# Patient Record
Sex: MALE | Race: ASIAN | Employment: OTHER | URBAN - METROPOLITAN AREA
[De-identification: names, ages, dates, MRNs, and addresses within clinical notes are randomized per-mention and may not be internally consistent; named-entity substitution may affect disease eponyms.]

---

## 2017-02-27 ENCOUNTER — TELEPHONE (OUTPATIENT)
Dept: INTERNAL MEDICINE CLINIC | Facility: CLINIC | Age: 42
End: 2017-02-27

## 2017-03-02 ENCOUNTER — OFFICE VISIT (OUTPATIENT)
Dept: INTERNAL MEDICINE CLINIC | Facility: CLINIC | Age: 42
End: 2017-03-02

## 2017-03-02 VITALS
BODY MASS INDEX: 34.96 KG/M2 | HEART RATE: 78 BPM | DIASTOLIC BLOOD PRESSURE: 80 MMHG | WEIGHT: 236 LBS | SYSTOLIC BLOOD PRESSURE: 122 MMHG | RESPIRATION RATE: 16 BRPM | HEIGHT: 69 IN

## 2017-03-02 DIAGNOSIS — R73.03 PREDIABETES: ICD-10-CM

## 2017-03-02 DIAGNOSIS — Z51.81 ENCOUNTER FOR THERAPEUTIC DRUG MONITORING: Primary | ICD-10-CM

## 2017-03-02 DIAGNOSIS — E78.2 MIXED HYPERLIPIDEMIA: ICD-10-CM

## 2017-03-02 DIAGNOSIS — E53.8 B12 DEFICIENCY: ICD-10-CM

## 2017-03-02 DIAGNOSIS — E66.09 OBESITY DUE TO EXCESS CALORIES, UNSPECIFIED OBESITY SEVERITY: ICD-10-CM

## 2017-03-02 PROCEDURE — 99214 OFFICE O/P EST MOD 30 MIN: CPT | Performed by: INTERNAL MEDICINE

## 2017-03-02 RX ORDER — PHENTERMINE HYDROCHLORIDE 37.5 MG/1
37.5 TABLET ORAL
Qty: 30 TABLET | Refills: 0 | Status: SHIPPED | OUTPATIENT
Start: 2017-03-02 | End: 2017-08-04

## 2017-03-02 RX ORDER — ATORVASTATIN CALCIUM 10 MG/1
10 TABLET, FILM COATED ORAL DAILY
Qty: 90 TABLET | Refills: 3 | Status: SHIPPED | OUTPATIENT
Start: 2017-03-02 | End: 2018-02-12

## 2017-03-02 NOTE — PROGRESS NOTES
CC: Patient presents with:  Weight Check: up 3 lb       HPI:   1. Obesity, pt doing well on phentermine, wellbutrin and topamax. Worsening hunger. No chest pain.    2. Prediabetes, doing well on metformin.    3. B12 deficiency, finished B12 shots, no pr feels well otherwise, denied any fevers chills or night sweats   CARDIOVASCULAR: denies chest pain    EXAM:   /80 mmHg  Pulse 78  Resp 16  Ht 69\"  Wt 236 lb  BMI 34.84 kg/m2  GENERAL: well nourished,in no apparent distress, A/O x3  HEENT: NCAT, OP-c

## 2017-03-18 ENCOUNTER — TELEPHONE (OUTPATIENT)
Dept: FAMILY MEDICINE CLINIC | Facility: CLINIC | Age: 42
End: 2017-03-18

## 2017-03-20 ENCOUNTER — TELEPHONE (OUTPATIENT)
Dept: FAMILY MEDICINE CLINIC | Facility: CLINIC | Age: 42
End: 2017-03-20

## 2017-03-27 ENCOUNTER — TELEPHONE (OUTPATIENT)
Dept: INTERNAL MEDICINE CLINIC | Facility: CLINIC | Age: 42
End: 2017-03-27

## 2017-03-28 ENCOUNTER — TELEPHONE (OUTPATIENT)
Dept: INTERNAL MEDICINE CLINIC | Facility: CLINIC | Age: 42
End: 2017-03-28

## 2017-08-04 ENCOUNTER — OFFICE VISIT (OUTPATIENT)
Dept: INTERNAL MEDICINE CLINIC | Facility: CLINIC | Age: 42
End: 2017-08-04

## 2017-08-04 VITALS
RESPIRATION RATE: 16 BRPM | WEIGHT: 234 LBS | HEART RATE: 84 BPM | SYSTOLIC BLOOD PRESSURE: 122 MMHG | DIASTOLIC BLOOD PRESSURE: 78 MMHG | BODY MASS INDEX: 34.66 KG/M2 | HEIGHT: 69 IN

## 2017-08-04 DIAGNOSIS — R73.03 PREDIABETES: ICD-10-CM

## 2017-08-04 DIAGNOSIS — E66.09 CLASS 2 OBESITY DUE TO EXCESS CALORIES WITHOUT SERIOUS COMORBIDITY WITH BODY MASS INDEX (BMI) OF 35.0 TO 35.9 IN ADULT: ICD-10-CM

## 2017-08-04 DIAGNOSIS — Z51.81 ENCOUNTER FOR THERAPEUTIC DRUG MONITORING: Primary | ICD-10-CM

## 2017-08-04 PROCEDURE — 99213 OFFICE O/P EST LOW 20 MIN: CPT | Performed by: INTERNAL MEDICINE

## 2017-08-04 RX ORDER — PHENTERMINE HYDROCHLORIDE 37.5 MG/1
37.5 TABLET ORAL
Qty: 30 TABLET | Refills: 0 | Status: SHIPPED | OUTPATIENT
Start: 2017-08-04 | End: 2017-09-14

## 2017-08-04 RX ORDER — PHENTERMINE HYDROCHLORIDE 37.5 MG/1
37.5 TABLET ORAL
COMMUNITY
End: 2017-08-04

## 2017-08-04 NOTE — PROGRESS NOTES
CC: Patient presents with:  Weight Check: down 2 lb       HPI:   1. Obesity, pt doing well on phentermine, wellbutrin and topamax. 2. Prediabetes, doing well on metformin.        Current Outpatient Prescriptions:  Phentermine HCl 37.5 MG Oral Tab Take defined types were placed in this encounter.        Signed Prescriptions Disp Refills    Phentermine HCl 37.5 MG Oral Tab 30 tablet 0      Sig: Take 1 tablet (37.5 mg total) by mouth every morning before breakfast.          None     ASSESSMENT:   Encounter

## 2017-08-16 ENCOUNTER — TELEPHONE (OUTPATIENT)
Dept: FAMILY MEDICINE CLINIC | Facility: CLINIC | Age: 42
End: 2017-08-16

## 2017-08-16 NOTE — TELEPHONE ENCOUNTER
[facility] patient    Pt is currently on Phentermine 37.5 mg and Metformin  mg   Reports burning sensation in stomach (acidity) after taking medication. Pt reports is goes away after taking a glass milk.  Pt reports taking both medications in the morning on

## 2017-08-16 NOTE — TELEPHONE ENCOUNTER
- Patient stated that he is currently taking Phentermine HCl 37.5 MG Oral Tab and MetFORMIN HCl  MG Oral Tablet 24 Hr and it is causing him to have a burning sensation in his stomach.  He would like to speak with a nurse to discuss the sympto

## 2017-08-17 NOTE — TELEPHONE ENCOUNTER
[8/17/2017 11:43 AM] Child, Gabbie:   Dr. Molly Hickey said that Jeramy Delfino can take Prilosec OTC as needed. 1 tablet daily]    LM on cell voicemail for patient to call back.

## 2017-08-17 NOTE — TELEPHONE ENCOUNTER
[facility] message sent to [place], [place] with Dr. Adrianna Valdivia in regards to this message.

## 2017-08-18 NOTE — TELEPHONE ENCOUNTER
Spoke to patient and informed of Dr Noble Means recommendation, verbalized understanding, requested that medication name to be sent to his [employer] as well. Message sent via [employer]. No further questions at this time.     Future Appointments  Date Time Provider

## 2017-08-19 ENCOUNTER — LAB ENCOUNTER (OUTPATIENT)
Dept: LAB | Facility: HOSPITAL | Age: 42
End: 2017-08-19
Attending: INTERNAL MEDICINE
Payer: COMMERCIAL

## 2017-08-19 DIAGNOSIS — E78.2 MIXED HYPERLIPIDEMIA: Primary | ICD-10-CM

## 2017-08-19 DIAGNOSIS — E53.8 VITAMIN B 12 DEFICIENCY: ICD-10-CM

## 2017-08-19 DIAGNOSIS — E66.09 OBESITY DUE TO EXCESS CALORIES: ICD-10-CM

## 2017-08-19 LAB
25-HYDROXYVITAMIN D (TOTAL): 28 NG/ML (ref 30–100)
ALBUMIN SERPL-MCNC: 4.1 G/DL (ref 3.5–4.8)
ALP LIVER SERPL-CCNC: 79 U/L (ref 45–117)
ALT SERPL-CCNC: 54 U/L (ref 17–63)
AST SERPL-CCNC: 24 U/L (ref 15–41)
BASOPHILS # BLD AUTO: 0.07 X10(3) UL (ref 0–0.1)
BASOPHILS NFR BLD AUTO: 1.1 %
BILIRUB SERPL-MCNC: 0.7 MG/DL (ref 0.1–2)
BILIRUB UR QL STRIP.AUTO: NEGATIVE
BUN BLD-MCNC: 9 MG/DL (ref 8–20)
CALCIUM BLD-MCNC: 9.2 MG/DL (ref 8.3–10.3)
CHLORIDE: 108 MMOL/L (ref 101–111)
CHOLEST SMN-MCNC: 90 MG/DL (ref ?–200)
CLARITY UR REFRACT.AUTO: CLEAR
CO2: 23 MMOL/L (ref 22–32)
COLOR UR AUTO: YELLOW
CREAT BLD-MCNC: 1 MG/DL (ref 0.7–1.3)
EOSINOPHIL # BLD AUTO: 0.19 X10(3) UL (ref 0–0.3)
EOSINOPHIL NFR BLD AUTO: 2.9 %
ERYTHROCYTE [DISTWIDTH] IN BLOOD BY AUTOMATED COUNT: 12.8 % (ref 11.5–16)
GLUCOSE BLD-MCNC: 88 MG/DL (ref 70–99)
GLUCOSE UR STRIP.AUTO-MCNC: NEGATIVE MG/DL
HAV AB SERPL IA-ACNC: 312 PG/ML (ref 193–986)
HCT VFR BLD AUTO: 47.4 % (ref 37–53)
HDLC SERPL-MCNC: 39 MG/DL (ref 45–?)
HDLC SERPL: 2.31 {RATIO} (ref ?–4.97)
HGB BLD-MCNC: 15.8 G/DL (ref 13–17)
IMMATURE GRANULOCYTE COUNT: 0.02 X10(3) UL (ref 0–1)
IMMATURE GRANULOCYTE RATIO %: 0.3 %
KETONES UR STRIP.AUTO-MCNC: NEGATIVE MG/DL
LDLC SERPL CALC-MCNC: 30 MG/DL (ref ?–130)
LDLC SERPL-MCNC: 21 MG/DL (ref 5–40)
LEUKOCYTE ESTERASE UR QL STRIP.AUTO: NEGATIVE
LYMPHOCYTES # BLD AUTO: 3.17 X10(3) UL (ref 0.9–4)
LYMPHOCYTES NFR BLD AUTO: 48 %
M PROTEIN MFR SERPL ELPH: 7.7 G/DL (ref 6.1–8.3)
MCH RBC QN AUTO: 27.4 PG (ref 27–33.2)
MCHC RBC AUTO-ENTMCNC: 33.3 G/DL (ref 31–37)
MCV RBC AUTO: 82.1 FL (ref 80–99)
MONOCYTES # BLD AUTO: 0.37 X10(3) UL (ref 0.1–0.6)
MONOCYTES NFR BLD AUTO: 5.6 %
NEUTROPHIL ABS PRELIM: 2.78 X10 (3) UL (ref 1.3–6.7)
NEUTROPHILS # BLD AUTO: 2.78 X10(3) UL (ref 1.3–6.7)
NEUTROPHILS NFR BLD AUTO: 42.1 %
NITRITE UR QL STRIP.AUTO: NEGATIVE
NONHDLC SERPL-MCNC: 51 MG/DL (ref ?–130)
PH UR STRIP.AUTO: 5.5 [PH] (ref 4.5–8)
PLATELET # BLD AUTO: 251 10(3)UL (ref 150–450)
POTASSIUM SERPL-SCNC: 4.1 MMOL/L (ref 3.6–5.1)
PROT UR STRIP.AUTO-MCNC: NEGATIVE MG/DL
RBC # BLD AUTO: 5.77 X10(6)UL (ref 4.3–5.7)
RBC UR QL AUTO: NEGATIVE
RED CELL DISTRIBUTION WIDTH-SD: 37.7 FL (ref 35.1–46.3)
SODIUM SERPL-SCNC: 140 MMOL/L (ref 136–144)
SP GR UR STRIP.AUTO: >=1.03 (ref 1–1.03)
TRIGLYCERIDES: 104 MG/DL (ref ?–150)
TSI SER-ACNC: 1.26 MIU/ML (ref 0.35–5.5)
UROBILINOGEN UR STRIP.AUTO-MCNC: 0.2 MG/DL
WBC # BLD AUTO: 6.6 X10(3) UL (ref 4–13)

## 2017-08-19 PROCEDURE — 82306 VITAMIN D 25 HYDROXY: CPT

## 2017-08-19 PROCEDURE — 85025 COMPLETE CBC W/AUTO DIFF WBC: CPT

## 2017-08-19 PROCEDURE — 80061 LIPID PANEL: CPT

## 2017-08-19 PROCEDURE — 36415 COLL VENOUS BLD VENIPUNCTURE: CPT

## 2017-08-19 PROCEDURE — 84443 ASSAY THYROID STIM HORMONE: CPT

## 2017-08-19 PROCEDURE — 80053 COMPREHEN METABOLIC PANEL: CPT

## 2017-08-19 PROCEDURE — 81003 URINALYSIS AUTO W/O SCOPE: CPT

## 2017-08-19 PROCEDURE — 82607 VITAMIN B-12: CPT

## 2017-08-21 NOTE — PROGRESS NOTES
Low B12  Low Vit D, begin 50,000 U twice weekly for 6 months, and then 1,000 U daily after that  F/U as scheduled

## 2017-08-23 ENCOUNTER — OFFICE VISIT (OUTPATIENT)
Dept: FAMILY MEDICINE CLINIC | Facility: CLINIC | Age: 42
End: 2017-08-23

## 2017-08-23 VITALS
RESPIRATION RATE: 16 BRPM | HEART RATE: 98 BPM | DIASTOLIC BLOOD PRESSURE: 80 MMHG | OXYGEN SATURATION: 99 % | SYSTOLIC BLOOD PRESSURE: 122 MMHG | BODY MASS INDEX: 31.5 KG/M2 | HEIGHT: 71 IN | WEIGHT: 225 LBS | TEMPERATURE: 99 F

## 2017-08-23 DIAGNOSIS — J20.9 BRONCHITIS WITH BRONCHOSPASM: Primary | ICD-10-CM

## 2017-08-23 PROCEDURE — 99213 OFFICE O/P EST LOW 20 MIN: CPT | Performed by: NURSE PRACTITIONER

## 2017-08-23 RX ORDER — PREDNISONE 20 MG/1
20 TABLET ORAL DAILY
Qty: 5 TABLET | Refills: 0 | Status: SHIPPED | OUTPATIENT
Start: 2017-08-23 | End: 2017-08-28

## 2017-08-23 RX ORDER — ERGOCALCIFEROL 1.25 MG/1
CAPSULE ORAL
Qty: 48 CAPSULE | Refills: 0 | OUTPATIENT
Start: 2017-08-23

## 2017-08-23 RX ORDER — AZITHROMYCIN 250 MG/1
TABLET, FILM COATED ORAL
Qty: 6 TABLET | Refills: 0 | Status: SHIPPED | OUTPATIENT
Start: 2017-08-23 | End: 2017-09-14

## 2017-08-23 NOTE — TELEPHONE ENCOUNTER
See result note. Patient needs to call office back to discuss labs and med will be sent.     Notes Recorded by Keenan Lucio RN on 8/21/2017 at 4:31 PM CDT  Time started: [address] ended: 431  Total time spent on chart: one min    I called patient to i

## 2017-08-25 RX ORDER — ERGOCALCIFEROL 1.25 MG/1
50000 CAPSULE ORAL
Qty: 24 CAPSULE | Refills: 1 | Status: SHIPPED | OUTPATIENT
Start: 2017-08-28 | End: 2017-11-17

## 2017-09-14 ENCOUNTER — OFFICE VISIT (OUTPATIENT)
Dept: INTERNAL MEDICINE CLINIC | Facility: CLINIC | Age: 42
End: 2017-09-14

## 2017-09-14 VITALS
DIASTOLIC BLOOD PRESSURE: 78 MMHG | SYSTOLIC BLOOD PRESSURE: 122 MMHG | BODY MASS INDEX: 33.77 KG/M2 | HEIGHT: 69 IN | WEIGHT: 228 LBS | RESPIRATION RATE: 16 BRPM | HEART RATE: 78 BPM

## 2017-09-14 DIAGNOSIS — E66.09 CLASS 2 OBESITY DUE TO EXCESS CALORIES WITHOUT SERIOUS COMORBIDITY WITH BODY MASS INDEX (BMI) OF 35.0 TO 35.9 IN ADULT: ICD-10-CM

## 2017-09-14 DIAGNOSIS — E55.9 VITAMIN D DEFICIENCY: ICD-10-CM

## 2017-09-14 DIAGNOSIS — Z51.81 ENCOUNTER FOR THERAPEUTIC DRUG MONITORING: Primary | ICD-10-CM

## 2017-09-14 DIAGNOSIS — E53.8 B12 DEFICIENCY: ICD-10-CM

## 2017-09-14 DIAGNOSIS — R73.03 PREDIABETES: ICD-10-CM

## 2017-09-14 PROCEDURE — 99214 OFFICE O/P EST MOD 30 MIN: CPT | Performed by: INTERNAL MEDICINE

## 2017-09-14 PROCEDURE — 96372 THER/PROPH/DIAG INJ SC/IM: CPT | Performed by: INTERNAL MEDICINE

## 2017-09-14 RX ORDER — CYANOCOBALAMIN 1000 UG/ML
1000 INJECTION INTRAMUSCULAR; SUBCUTANEOUS ONCE
Status: COMPLETED | OUTPATIENT
Start: 2017-09-14 | End: 2017-09-14

## 2017-09-14 RX ORDER — PHENTERMINE HYDROCHLORIDE 37.5 MG/1
37.5 TABLET ORAL
Qty: 30 TABLET | Refills: 0 | Status: SHIPPED | OUTPATIENT
Start: 2017-09-14 | End: 2018-02-12

## 2017-09-14 RX ADMIN — CYANOCOBALAMIN 1000 MCG: 1000 INJECTION INTRAMUSCULAR; SUBCUTANEOUS at 14:52:00

## 2017-09-14 NOTE — PATIENT INSTRUCTIONS
allegra 180mg qam  Zyrtec 10mg qpm  flonase 2 puffs in each nostril daily  prilsoe 20mg daily for 1 week.

## 2017-09-14 NOTE — PROGRESS NOTES
CC: Patient presents with:  Weight Check: down 6 lb       HPI:   1. Obesity, pt doing well on phentermine, wellbutrin and topamax. No chest pain. 2. Prediabetes, doing well on metformin. 3. Vit D def, doing well on vit D  4.  Vit B12 def, some fatig 69\"   Wt 228 lb   BMI 33.67 kg/m²   GENERAL:  A/O x3  HEENT: NCAT, OP+PND, PERRLA  LUNGS: CTA bilat   CARDIO: RRR without murmur    No orders of the defined types were placed in this encounter.        Signed Prescriptions Disp Refills    Phentermine HCl 37

## 2017-09-20 ENCOUNTER — TELEPHONE (OUTPATIENT)
Dept: FAMILY MEDICINE CLINIC | Facility: CLINIC | Age: 42
End: 2017-09-20

## 2017-09-20 NOTE — TELEPHONE ENCOUNTER
Returned patient's call x2. Phone rings and then changes to a busy signal, unable to leave a message.

## 2017-09-20 NOTE — TELEPHONE ENCOUNTER
Pt called states he was sen 2 weeks ago states meds are not working pt would like another rx prescribed

## 2017-09-21 NOTE — TELEPHONE ENCOUNTER
Time started: 1009    Time ended: 1009    Total time spent on chart: 30 seconds. FYI says to call home number - called that and left message to call back.

## 2017-09-22 NOTE — TELEPHONE ENCOUNTER
Attempted to contact pt, 3rd Outreach, no answer on any of the numbers below, VM left on all the phone numbers, left message to call the office. Telephone Information:   Home Phone [phone]   Work Phone Not on file.    Mobile [address]

## 2017-09-25 NOTE — TELEPHONE ENCOUNTER
Patient was last seen for cough and told to use: Allegra in am  Zyrtec in night  Nasal spray    Worse at night. Advised he would need to be evaluated - patient was hesitant.   I explained that Dr. Oli Jara is no longer with [facility] and another provider will

## 2018-02-12 PROBLEM — F52.4 PREMATURE EJACULATION: Status: ACTIVE | Noted: 2018-02-12

## 2018-02-12 PROCEDURE — 82607 VITAMIN B-12: CPT | Performed by: INTERNAL MEDICINE

## 2018-03-21 PROBLEM — M54.9 BACK PAIN: Status: ACTIVE | Noted: 2018-03-21

## 2018-04-25 ENCOUNTER — OFFICE VISIT (OUTPATIENT)
Dept: FAMILY MEDICINE CLINIC | Facility: CLINIC | Age: 43
End: 2018-04-25

## 2018-04-25 ENCOUNTER — HOSPITAL ENCOUNTER (EMERGENCY)
Facility: HOSPITAL | Age: 43
Discharge: HOME | End: 2018-04-25
Attending: EMERGENCY MEDICINE
Payer: MEDICAID

## 2018-04-25 VITALS
HEART RATE: 78 BPM | BODY MASS INDEX: 32.9 KG/M2 | RESPIRATION RATE: 18 BRPM | TEMPERATURE: 98 F | WEIGHT: 235 LBS | HEIGHT: 71 IN | SYSTOLIC BLOOD PRESSURE: 142 MMHG | DIASTOLIC BLOOD PRESSURE: 76 MMHG | OXYGEN SATURATION: 99 %

## 2018-04-25 VITALS
HEART RATE: 82 BPM | HEIGHT: 71 IN | BODY MASS INDEX: 32.76 KG/M2 | RESPIRATION RATE: 18 BRPM | DIASTOLIC BLOOD PRESSURE: 82 MMHG | SYSTOLIC BLOOD PRESSURE: 130 MMHG | WEIGHT: 234 LBS | OXYGEN SATURATION: 98 % | TEMPERATURE: 98 F

## 2018-04-25 DIAGNOSIS — Z02.9 ENCOUNTER FOR ADMINISTRATIVE EXAMINATIONS, UNSPECIFIED: Primary | ICD-10-CM

## 2018-04-25 DIAGNOSIS — R19.7 DIARRHEA, UNSPECIFIED TYPE: ICD-10-CM

## 2018-04-25 DIAGNOSIS — J02.9 SORE THROAT: ICD-10-CM

## 2018-04-25 DIAGNOSIS — R50.9 FEVER, UNSPECIFIED FEVER CAUSE: Primary | ICD-10-CM

## 2018-04-25 DIAGNOSIS — T75.3XXA MOTION SICKNESS, INITIAL ENCOUNTER: ICD-10-CM

## 2018-04-25 PROCEDURE — 81003 URINALYSIS AUTO W/O SCOPE: CPT | Performed by: EMERGENCY MEDICINE

## 2018-04-25 PROCEDURE — 86403 PARTICLE AGGLUT ANTBDY SCRN: CPT | Performed by: EMERGENCY MEDICINE

## 2018-04-25 PROCEDURE — 87272 CRYPTOSPORIDIUM AG IF: CPT | Performed by: EMERGENCY MEDICINE

## 2018-04-25 PROCEDURE — 87045 FECES CULTURE AEROBIC BACT: CPT | Performed by: EMERGENCY MEDICINE

## 2018-04-25 PROCEDURE — 87046 STOOL CULTR AEROBIC BACT EA: CPT | Performed by: EMERGENCY MEDICINE

## 2018-04-25 PROCEDURE — 87177 OVA AND PARASITES SMEARS: CPT | Performed by: EMERGENCY MEDICINE

## 2018-04-25 PROCEDURE — 87209 SMEAR COMPLEX STAIN: CPT | Performed by: EMERGENCY MEDICINE

## 2018-04-25 PROCEDURE — 87329 GIARDIA AG IA: CPT | Performed by: EMERGENCY MEDICINE

## 2018-04-25 PROCEDURE — 87427 SHIGA-LIKE TOXIN AG IA: CPT | Performed by: EMERGENCY MEDICINE

## 2018-04-25 PROCEDURE — 96360 HYDRATION IV INFUSION INIT: CPT

## 2018-04-25 PROCEDURE — 87207 SMEAR SPECIAL STAIN: CPT | Performed by: EMERGENCY MEDICINE

## 2018-04-25 PROCEDURE — 85025 COMPLETE CBC W/AUTO DIFF WBC: CPT | Performed by: EMERGENCY MEDICINE

## 2018-04-25 PROCEDURE — 99284 EMERGENCY DEPT VISIT MOD MDM: CPT

## 2018-04-25 PROCEDURE — 96361 HYDRATE IV INFUSION ADD-ON: CPT

## 2018-04-25 PROCEDURE — 82272 OCCULT BLD FECES 1-3 TESTS: CPT | Performed by: EMERGENCY MEDICINE

## 2018-04-25 PROCEDURE — 87040 BLOOD CULTURE FOR BACTERIA: CPT | Performed by: EMERGENCY MEDICINE

## 2018-04-25 PROCEDURE — 80053 COMPREHEN METABOLIC PANEL: CPT | Performed by: EMERGENCY MEDICINE

## 2018-04-25 PROCEDURE — 87880 STREP A ASSAY W/OPTIC: CPT | Performed by: NURSE PRACTITIONER

## 2018-04-25 PROCEDURE — 36415 COLL VENOUS BLD VENIPUNCTURE: CPT

## 2018-04-25 PROCEDURE — 87493 C DIFF AMPLIFIED PROBE: CPT | Performed by: EMERGENCY MEDICINE

## 2018-04-25 RX ORDER — CIPROFLOXACIN 500 MG/1
500 TABLET, FILM COATED ORAL 2 TIMES DAILY
Qty: 14 TABLET | Refills: 0 | Status: SHIPPED | OUTPATIENT
Start: 2018-04-25 | End: 2018-05-02

## 2018-04-25 NOTE — ED INITIAL ASSESSMENT (HPI)
Patient states he is recently back from [place], diarrhea/cramps/abd pain and sore throat  x 3 weeks. Strep negative. Patient states he has been feverish since last night.  Patient with episodes abdominal tenderness and cramping worse with diarrhea

## 2018-04-25 NOTE — PROGRESS NOTES
Patient presents with:  Cough: diarrhea, cramps and abdominal pain, sore throat  x 3 weeks fatigue, bodyaches x 1 day   Per patient he just returned from [place]. He had the symptoms for approximately 3 weeks.   He took some OTC medication for his diarrhe

## 2018-04-26 NOTE — ED PROVIDER NOTES
Patient Seen in: [facility] Emergency Department    History   Patient presents with:  Abdomen/Flank Pain (GI/)    Stated Complaint: abd pain    HPI    Patient is a 48-year-old male who was in [place] for 3 weeks. Return yesterday.   He has had lo acute distress  HEENT: Normal cephalic atraumatic. Nonicteric sclera. Moist mucous membranes. No meningismus. No adenopathy  Lungs: No tachypnea. Lungs clear to auscultation bilaterally without rales/rhonchi.   Equal breath sounds bilaterally  Cardiac: Abnormality         Status                     ---------                               -----------         ------                     OVA AND PARASITES(P)[[id number]]                             In process                 GIARDIA + CRYPT

## 2018-08-20 PROBLEM — R10.31 INGUINAL PAIN, RIGHT: Status: ACTIVE | Noted: 2018-08-20

## 2018-10-01 ENCOUNTER — HOSPITAL ENCOUNTER (EMERGENCY)
Facility: HOSPITAL | Age: 43
Discharge: HOME | End: 2018-10-01
Attending: EMERGENCY MEDICINE
Payer: MEDICAID

## 2018-10-01 VITALS
DIASTOLIC BLOOD PRESSURE: 70 MMHG | WEIGHT: 230 LBS | SYSTOLIC BLOOD PRESSURE: 124 MMHG | OXYGEN SATURATION: 98 % | HEART RATE: 64 BPM | BODY MASS INDEX: 32.2 KG/M2 | TEMPERATURE: 98 F | RESPIRATION RATE: 16 BRPM | HEIGHT: 71 IN

## 2018-10-01 DIAGNOSIS — H81.399 PERIPHERAL VERTIGO, UNSPECIFIED LATERALITY: Primary | ICD-10-CM

## 2018-10-01 PROCEDURE — 96374 THER/PROPH/DIAG INJ IV PUSH: CPT

## 2018-10-01 PROCEDURE — 80053 COMPREHEN METABOLIC PANEL: CPT | Performed by: EMERGENCY MEDICINE

## 2018-10-01 PROCEDURE — 80053 COMPREHEN METABOLIC PANEL: CPT

## 2018-10-01 PROCEDURE — 85025 COMPLETE CBC W/AUTO DIFF WBC: CPT

## 2018-10-01 PROCEDURE — 99284 EMERGENCY DEPT VISIT MOD MDM: CPT

## 2018-10-01 PROCEDURE — 83690 ASSAY OF LIPASE: CPT | Performed by: EMERGENCY MEDICINE

## 2018-10-01 PROCEDURE — 85025 COMPLETE CBC W/AUTO DIFF WBC: CPT | Performed by: EMERGENCY MEDICINE

## 2018-10-01 PROCEDURE — 83690 ASSAY OF LIPASE: CPT

## 2018-10-01 RX ORDER — ONDANSETRON 2 MG/ML
4 INJECTION INTRAMUSCULAR; INTRAVENOUS ONCE
Status: COMPLETED | OUTPATIENT
Start: 2018-10-01 | End: 2018-10-01

## 2018-10-01 RX ORDER — MECLIZINE HYDROCHLORIDE 25 MG/1
25 TABLET ORAL 3 TIMES DAILY PRN
Qty: 3 TABLET | Refills: 0 | Status: SHIPPED | OUTPATIENT
Start: 2018-10-01 | End: 2019-01-03 | Stop reason: ALTCHOICE

## 2018-10-01 RX ORDER — ONDANSETRON 4 MG/1
4 TABLET, ORALLY DISINTEGRATING ORAL EVERY 4 HOURS PRN
Qty: 10 TABLET | Refills: 0 | Status: SHIPPED | OUTPATIENT
Start: 2018-10-01 | End: 2018-10-08

## 2018-10-01 RX ORDER — MECLIZINE HYDROCHLORIDE 25 MG/1
50 TABLET ORAL ONCE
Status: COMPLETED | OUTPATIENT
Start: 2018-10-01 | End: 2018-10-01

## 2018-10-01 NOTE — ED NOTES
This RN is completing pt triage assessment, pt started saying, \"these are all stupid questions. \" Informed pt that these questions are part of the process which includes safety questions.  Pt continues to say, \"such stupid questions, I already told you th

## 2018-10-01 NOTE — ED NOTES
This RN to bedside after noted that pt shouting at bedside RN, calling her stupid. Pt reports that RN was being rude and asking stupid questions.   This RN apologized if pt felt that RN was being rude and asked what specific question the patient found \"st

## 2018-10-01 NOTE — ED INITIAL ASSESSMENT (HPI)
Pt c/o diffused abd pain with n/v x3 since 2200 last noc. Also reports dizziness rocio with movement.  Pt denies visual changes, denies urinary sxs, 1x diarrhea, denies KAHLIL, denies CP

## 2018-10-01 NOTE — ED PROVIDER NOTES
Patient Seen in: [facility] Emergency Department    History   Patient presents with:  Nausea/Vomiting/Diarrhea (gastrointestinal)  Dizziness (neurologic)    Stated Complaint: nvd    HPI    Patient's 41-year-old gentleman complains of dizziness descr 98 %   O2 Device None (Room air)       Current:/67   Pulse 60   Temp 97.5 °F (36.4 °C) (Temporal)   Resp 16   Ht 180.3 cm (5' 11\")   Wt 104.3 kg   SpO2 98%   BMI 32.08 kg/m²          Physical Exam   Constitutional: He is oriented to person, place, a MDM   Patient was brought back to the examination room immediately. The patient was then placed on a cardiac monitor and pulse oximetry was applied. Patient had an IV established and labs were drawn.     Patient laboratory workup is reassuri

## 2018-10-23 ENCOUNTER — HOSPITAL ENCOUNTER (OUTPATIENT)
Age: 43
Discharge: HOME | End: 2018-10-23
Attending: FAMILY MEDICINE
Payer: MEDICAID

## 2018-10-23 VITALS
BODY MASS INDEX: 32.58 KG/M2 | OXYGEN SATURATION: 97 % | DIASTOLIC BLOOD PRESSURE: 79 MMHG | SYSTOLIC BLOOD PRESSURE: 128 MMHG | WEIGHT: 220 LBS | TEMPERATURE: 98 F | HEIGHT: 69 IN | HEART RATE: 80 BPM | RESPIRATION RATE: 16 BRPM

## 2018-10-23 DIAGNOSIS — M25.511 ACUTE PAIN OF RIGHT SHOULDER: ICD-10-CM

## 2018-10-23 DIAGNOSIS — J02.9 ACUTE VIRAL PHARYNGITIS: Primary | ICD-10-CM

## 2018-10-23 PROCEDURE — 87081 CULTURE SCREEN ONLY: CPT | Performed by: FAMILY MEDICINE

## 2018-10-23 PROCEDURE — 99204 OFFICE O/P NEW MOD 45 MIN: CPT

## 2018-10-23 PROCEDURE — 99214 OFFICE O/P EST MOD 30 MIN: CPT

## 2018-10-23 PROCEDURE — 87430 STREP A AG IA: CPT | Performed by: FAMILY MEDICINE

## 2018-10-23 RX ORDER — BENZONATATE 100 MG/1
100 CAPSULE ORAL 3 TIMES DAILY PRN
Qty: 30 CAPSULE | Refills: 0 | Status: SHIPPED | OUTPATIENT
Start: 2018-10-23 | End: 2018-11-22

## 2018-10-23 RX ORDER — PREDNISONE 20 MG/1
20 TABLET ORAL DAILY
Qty: 3 TABLET | Refills: 0 | Status: SHIPPED | OUTPATIENT
Start: 2018-10-23 | End: 2018-10-26

## 2018-10-23 RX ORDER — IBUPROFEN 600 MG/1
600 TABLET ORAL EVERY 8 HOURS PRN
Qty: 30 TABLET | Refills: 0 | Status: SHIPPED | OUTPATIENT
Start: 2018-10-23 | End: 2018-10-30

## 2018-10-23 NOTE — ED PROVIDER NOTES
Patient Seen in: [facility]    History   Patient presents with:  Cough/URI    Stated Complaint: cough, sore throat x3 days    HPI    44-year-old male with history of diabetes, hypertension, allergic rhinitis, sleep apnea pr in HPI. Constitutional and vital signs reviewed. All other systems reviewed and negative except as noted above.     Physical Exam     ED Triage Vitals [10/23/18 1150]   /79   Pulse 80   Resp 16   Temp 97.8 °F (36.6 °C)   Temp src Temporal   SpO2 Discharge Medication List    START taking these medications    benzonatate 100 MG Oral Cap  Take 1 capsule (100 mg total) by mouth 3 (three) times daily as needed for cough.   Qty: 30 capsule Refills: 0    predniSONE 20 MG Oral Tab  Take 1 tablet (20 mg tot

## 2018-10-26 ENCOUNTER — HOSPITAL ENCOUNTER (OUTPATIENT)
Age: 43
Discharge: HOME | End: 2018-10-26
Attending: FAMILY MEDICINE
Payer: MEDICAID

## 2018-10-26 VITALS
OXYGEN SATURATION: 96 % | RESPIRATION RATE: 16 BRPM | BODY MASS INDEX: 32.2 KG/M2 | WEIGHT: 230 LBS | SYSTOLIC BLOOD PRESSURE: 123 MMHG | TEMPERATURE: 98 F | HEART RATE: 85 BPM | DIASTOLIC BLOOD PRESSURE: 76 MMHG | HEIGHT: 71 IN

## 2018-10-26 DIAGNOSIS — J00 ACUTE NASOPHARYNGITIS: Primary | ICD-10-CM

## 2018-10-26 PROCEDURE — 99214 OFFICE O/P EST MOD 30 MIN: CPT

## 2018-10-26 PROCEDURE — 99213 OFFICE O/P EST LOW 20 MIN: CPT

## 2018-10-26 RX ORDER — CODEINE PHOSPHATE AND GUAIFENESIN 10; 100 MG/5ML; MG/5ML
5 SOLUTION ORAL EVERY 6 HOURS PRN
Qty: 100 ML | Refills: 0 | Status: SHIPPED | OUTPATIENT
Start: 2018-10-26 | End: 2019-01-03 | Stop reason: ALTCHOICE

## 2018-10-26 NOTE — ED PROVIDER NOTES
Patient Seen in: [facility]    History   Patient presents with:  Cough/URI  Sore Throat    Stated Complaint: sore throat;cough x7 days    HPI    68-year-old male with history allergic rhinitis, diabetes, and hypertension wa to palpation. Bilateral TMs are clear. Bilateral nares are clear. MMM. Posterior pharynx with mild erythema. No exudate. Uvula is midline. Neck: Supple, NT, FROM. No meningeal signs. LAD: No lymphadenopathy.   Heart: S1,S2 RRR, No murmur  Lungs: CTA bila

## 2018-11-30 ENCOUNTER — HOSPITAL ENCOUNTER (OUTPATIENT)
Age: 43
Discharge: HOME | End: 2018-11-30
Attending: EMERGENCY MEDICINE
Payer: MEDICAID

## 2018-11-30 ENCOUNTER — LAB ENCOUNTER (OUTPATIENT)
Dept: LAB | Facility: HOSPITAL | Age: 43
End: 2018-11-30
Attending: INTERNAL MEDICINE
Payer: MEDICAID

## 2018-11-30 VITALS
DIASTOLIC BLOOD PRESSURE: 82 MMHG | BODY MASS INDEX: 32.2 KG/M2 | SYSTOLIC BLOOD PRESSURE: 127 MMHG | HEART RATE: 80 BPM | TEMPERATURE: 97 F | RESPIRATION RATE: 18 BRPM | OXYGEN SATURATION: 98 % | WEIGHT: 230 LBS | HEIGHT: 71 IN

## 2018-11-30 DIAGNOSIS — R73.03 PREDIABETES: ICD-10-CM

## 2018-11-30 DIAGNOSIS — F52.4 PREMATURE EJACULATION: ICD-10-CM

## 2018-11-30 DIAGNOSIS — Z51.81 ENCOUNTER FOR THERAPEUTIC DRUG MONITORING: ICD-10-CM

## 2018-11-30 DIAGNOSIS — Z00.00 WELLNESS EXAMINATION: ICD-10-CM

## 2018-11-30 DIAGNOSIS — Z23 NEED FOR PROPHYLACTIC VACCINATION WITH COMBINED DIPHTHERIA-TETANUS-PERTUSSIS (DTP) VACCINE: ICD-10-CM

## 2018-11-30 DIAGNOSIS — R11.0 NAUSEA: Primary | ICD-10-CM

## 2018-11-30 DIAGNOSIS — R10.31 INGUINAL PAIN, RIGHT: ICD-10-CM

## 2018-11-30 DIAGNOSIS — E78.2 MIXED HYPERLIPIDEMIA: ICD-10-CM

## 2018-11-30 DIAGNOSIS — Z01.89 ROUTINE LAB DRAW: ICD-10-CM

## 2018-11-30 DIAGNOSIS — E66.09 CLASS 2 OBESITY DUE TO EXCESS CALORIES WITHOUT SERIOUS COMORBIDITY WITH BODY MASS INDEX (BMI) OF 35.0 TO 35.9 IN ADULT: ICD-10-CM

## 2018-11-30 PROCEDURE — 80053 COMPREHEN METABOLIC PANEL: CPT

## 2018-11-30 PROCEDURE — 82607 VITAMIN B-12: CPT

## 2018-11-30 PROCEDURE — 99214 OFFICE O/P EST MOD 30 MIN: CPT

## 2018-11-30 PROCEDURE — 36415 COLL VENOUS BLD VENIPUNCTURE: CPT

## 2018-11-30 PROCEDURE — 99213 OFFICE O/P EST LOW 20 MIN: CPT

## 2018-11-30 PROCEDURE — 84443 ASSAY THYROID STIM HORMONE: CPT

## 2018-11-30 PROCEDURE — 84153 ASSAY OF PSA TOTAL: CPT

## 2018-11-30 PROCEDURE — 82306 VITAMIN D 25 HYDROXY: CPT

## 2018-11-30 PROCEDURE — 80061 LIPID PANEL: CPT

## 2018-11-30 PROCEDURE — 85025 COMPLETE CBC W/AUTO DIFF WBC: CPT

## 2018-11-30 PROCEDURE — 81003 URINALYSIS AUTO W/O SCOPE: CPT

## 2018-11-30 RX ORDER — FAMOTIDINE 40 MG/1
40 TABLET, FILM COATED ORAL 2 TIMES DAILY PRN
Qty: 30 TABLET | Refills: 0 | Status: SHIPPED | OUTPATIENT
Start: 2018-11-30 | End: 2018-12-30

## 2018-11-30 RX ORDER — ONDANSETRON 4 MG/1
4 TABLET, ORALLY DISINTEGRATING ORAL EVERY 4 HOURS PRN
Qty: 10 TABLET | Refills: 0 | Status: SHIPPED | OUTPATIENT
Start: 2018-11-30 | End: 2018-12-07

## 2018-11-30 NOTE — ED INITIAL ASSESSMENT (HPI)
Pt c/o nausea for 2 days. Pt states that he also has a poor appetite. Pt denies any abd pain and denies any vomiting or diarrhea. Pt also had blood work drawn this morning as an outpt from him PCP.

## 2018-11-30 NOTE — ED PROVIDER NOTES
Patient Seen in: [facility]    History   Patient presents with:  Nausea    Stated Complaint: nausea x1 day    HPI    56-year-old male presents complaining of nausea since yesterday morning which is transiently improved with Anicteric sclera. Oral mucosa is moist.  Oropharynx is normal.  Neck: No adenopathy or thyromegaly. Lungs are clear to auscultation. Heart exam: Normal S1-S2 without extra sounds or murmurs. Regular rate and rhythm. Abdomen: Normoactive bowel sounds.

## 2018-12-09 NOTE — PROGRESS NOTES
Low vit D, start vit D 50,000 U twice weekly for 6 months. B12 is below 400, will start B12 shots next visit.

## 2018-12-10 NOTE — PROGRESS NOTES
Results released to [facility] with providers notes previously  vitamin d ordered  Dr will discuss at next [address]. Pt has viewed [facility] results      Nothing further needed from MD or nurse. Closing encounter.

## 2018-12-19 ENCOUNTER — HOSPITAL ENCOUNTER (OUTPATIENT)
Age: 43
Discharge: HOME | End: 2018-12-19
Attending: FAMILY MEDICINE
Payer: MEDICAID

## 2018-12-19 VITALS
DIASTOLIC BLOOD PRESSURE: 74 MMHG | OXYGEN SATURATION: 97 % | SYSTOLIC BLOOD PRESSURE: 121 MMHG | HEART RATE: 64 BPM | RESPIRATION RATE: 16 BRPM | TEMPERATURE: 98 F

## 2018-12-19 DIAGNOSIS — H61.22 IMPACTED CERUMEN OF LEFT EAR: Primary | ICD-10-CM

## 2018-12-19 PROCEDURE — 99213 OFFICE O/P EST LOW 20 MIN: CPT

## 2018-12-19 PROCEDURE — 69209 REMOVE IMPACTED EAR WAX UNI: CPT

## 2018-12-19 NOTE — ED PROVIDER NOTES
Patient Seen in: [facility]    History   Patient presents with:  Ear Problem Pain (neurosensory)    Stated Complaint: ear pain x1 day    HPI    54-year-old gentleman well-known to me with a history of diabetes and hypertens Right TM is clear. Left ear canal is blocked with cerumen. Unable to assess TM. Nose: Bilateral nares are clear. Mouth: MMM. Posterior pharynx is clear. No erythema. No exudate. Uvula is midline. Neck: Supple, NT, FROM. No meningeal signs.   LAD: No

## 2018-12-19 NOTE — ED INITIAL ASSESSMENT (HPI)
Pt c/o ear discomfort and states it feels like his ears are clogged. Pt states that it has been going on for the last couple of days.

## 2018-12-25 NOTE — ED INITIAL ASSESSMENT (HPI)
Pt c/o cough with sore throat for 3 days. Pt also states he had a fever. Pt was taking deslym and ibuprofen. Pt also states that he has right shoulder pain for the last couple of months. Pt is moving his right arm without difficulty. [facility]

## 2019-06-11 ENCOUNTER — HOSPITAL ENCOUNTER (OUTPATIENT)
Age: 44
Discharge: HOME | End: 2019-06-11
Attending: FAMILY MEDICINE
Payer: MEDICAID

## 2019-06-11 VITALS
HEIGHT: 71 IN | DIASTOLIC BLOOD PRESSURE: 76 MMHG | RESPIRATION RATE: 18 BRPM | OXYGEN SATURATION: 97 % | WEIGHT: 225 LBS | BODY MASS INDEX: 31.5 KG/M2 | SYSTOLIC BLOOD PRESSURE: 119 MMHG | TEMPERATURE: 97 F | HEART RATE: 90 BPM

## 2019-06-11 DIAGNOSIS — J02.9 ACUTE VIRAL PHARYNGITIS: Primary | ICD-10-CM

## 2019-06-11 PROCEDURE — 99214 OFFICE O/P EST MOD 30 MIN: CPT

## 2019-06-11 PROCEDURE — 87430 STREP A AG IA: CPT | Performed by: FAMILY MEDICINE

## 2019-06-11 PROCEDURE — 87081 CULTURE SCREEN ONLY: CPT | Performed by: FAMILY MEDICINE

## 2019-06-11 RX ORDER — PREDNISONE 20 MG/1
20 TABLET ORAL DAILY
Qty: 5 TABLET | Refills: 0 | Status: SHIPPED | OUTPATIENT
Start: 2019-06-11 | End: 2019-06-16

## 2019-06-11 NOTE — ED PROVIDER NOTES
Patient Seen in: [facility]    History   Patient presents with:  Sore Throat    Stated Complaint: sore throat, fever x5 days    HPI    70-year-old male with a history of allergic's, diabetes, hypertension, and sleep apnea p Wt 102.1 kg   SpO2 97%   BMI 31.38 kg/m²         Physical Exam    Gen: Pleasant male in NAD  Head: Normocephalic atraumatic. No sinus tenderness on palpation. Ears: Normal external ear exam. Bilateral TMs are clear. Nose: Bilateral nares are clear.    Giselle Baltazar

## 2019-06-14 NOTE — ED NOTES
Attempted to contact the patient but was unsuccessful. Left a detailed message that his strep was negative, as the voicemail states the patient's name.

## 2019-06-15 ENCOUNTER — LAB ENCOUNTER (OUTPATIENT)
Dept: LAB | Facility: HOSPITAL | Age: 44
End: 2019-06-15
Attending: INTERNAL MEDICINE
Payer: MEDICAID

## 2019-06-15 DIAGNOSIS — R68.82 DECREASED LIBIDO: ICD-10-CM

## 2019-06-15 PROCEDURE — 84146 ASSAY OF PROLACTIN: CPT

## 2019-06-15 PROCEDURE — 82728 ASSAY OF FERRITIN: CPT

## 2019-06-15 PROCEDURE — 84402 ASSAY OF FREE TESTOSTERONE: CPT

## 2019-06-15 PROCEDURE — 36415 COLL VENOUS BLD VENIPUNCTURE: CPT

## 2019-06-15 PROCEDURE — 83540 ASSAY OF IRON: CPT

## 2019-06-15 PROCEDURE — 84443 ASSAY THYROID STIM HORMONE: CPT

## 2019-06-15 PROCEDURE — 84403 ASSAY OF TOTAL TESTOSTERONE: CPT

## 2019-06-15 PROCEDURE — 83550 IRON BINDING TEST: CPT

## 2019-07-16 ENCOUNTER — HOSPITAL ENCOUNTER (OUTPATIENT)
Age: 44
Discharge: HOME | End: 2019-07-16
Attending: EMERGENCY MEDICINE
Payer: MEDICAID

## 2019-07-16 VITALS
HEIGHT: 71 IN | TEMPERATURE: 98 F | DIASTOLIC BLOOD PRESSURE: 77 MMHG | HEART RATE: 76 BPM | RESPIRATION RATE: 18 BRPM | SYSTOLIC BLOOD PRESSURE: 129 MMHG | BODY MASS INDEX: 32.2 KG/M2 | OXYGEN SATURATION: 99 % | WEIGHT: 230 LBS

## 2019-07-16 DIAGNOSIS — K60.2 ANAL FISSURE: Primary | ICD-10-CM

## 2019-07-16 LAB — POCT HEMOCCULT: POSITIVE

## 2019-07-16 PROCEDURE — 99214 OFFICE O/P EST MOD 30 MIN: CPT

## 2019-07-16 PROCEDURE — 82272 OCCULT BLD FECES 1-3 TESTS: CPT | Performed by: EMERGENCY MEDICINE

## 2019-07-16 PROCEDURE — 99213 OFFICE O/P EST LOW 20 MIN: CPT

## 2019-07-16 NOTE — ED INITIAL ASSESSMENT (HPI)
The patient is here with complaints or rectal pain x 2 days with some scant amount of bright red blood both on toilet paper and in the toilet bowel. He believes he has had this a couple of years ago.   Denies any changes in bowel habits, diarrhea, constipa

## 2019-07-18 NOTE — ED PROVIDER NOTES
Patient Seen in: [facility]    History   Patient presents with:  Anal Problem (GI)    Stated Complaint: rectal pain    HPI    25-year-old male complaint of rectal pain the past few days he denies any constipation or history Notable for the following components:       Result Value    Hemoccult Positive (*)     All other components within normal limits                MDM   Patient has a anal fissure he was given a prescription for nitroglycerin and Proctofoam HC advised to foll

## 2019-07-22 ENCOUNTER — OFFICE VISIT (OUTPATIENT)
Dept: SURGERY | Facility: CLINIC | Age: 44
End: 2019-07-22
Payer: MEDICAID

## 2019-07-22 ENCOUNTER — TELEPHONE (OUTPATIENT)
Dept: SURGERY | Facility: CLINIC | Age: 44
End: 2019-07-22

## 2019-07-22 VITALS
SYSTOLIC BLOOD PRESSURE: 104 MMHG | TEMPERATURE: 99 F | HEART RATE: 71 BPM | WEIGHT: 229 LBS | DIASTOLIC BLOOD PRESSURE: 71 MMHG | BODY MASS INDEX: 32.06 KG/M2 | HEIGHT: 71 IN

## 2019-07-22 DIAGNOSIS — K60.0 ACUTE POSTERIOR ANAL FISSURE: Primary | ICD-10-CM

## 2019-07-22 PROCEDURE — 99243 OFF/OP CNSLTJ NEW/EST LOW 30: CPT | Performed by: SURGERY

## 2019-07-22 NOTE — TELEPHONE ENCOUNTER
Pt wife phoned office. States pt was seen in office today. Prescribed nitroglycerin, which will arrive in 3-4 days. Talked to Dr. Yong Encinas may take OTC hydrocortisone. Verbalizes understanding.

## 2019-07-22 NOTE — H&P
New Patient Visit Note       Active Problems      1.  Acute posterior anal fissure        Chief Complaint   Patient presents with:  New Patient: NP anal fissure, ref by Urgent Care, onset 1 wk. ago       History of Present Illness     Pt seen at the request Packs/day: 0.50        Years: 23.00        Pack years: 11.5        Types: Cigarettes      Smokeless tobacco: Never Used      Tobacco comment: in process of quitting    Substance and Sexual Activity      Alcohol use: No        Alcohol/week: 0.0 standard dri bruise/bleed easily. Psychiatric/Behavioral: Negative for behavioral problems and sleep disturbance.        Physical Findings   /71   Pulse 71   Temp 98.6 °F (37 °C)   Ht 71\"   Wt 229 lb   BMI 31.94 kg/m²   Physical Exam   Constitutional: He appear

## 2019-08-27 PROBLEM — M19.041 ARTHRITIS OF FINGER OF RIGHT HAND: Status: ACTIVE | Noted: 2019-08-27

## 2019-09-06 ENCOUNTER — HOSPITAL ENCOUNTER (OUTPATIENT)
Age: 44
Discharge: HOME | End: 2019-09-06
Attending: EMERGENCY MEDICINE
Payer: MEDICAID

## 2019-09-06 VITALS
TEMPERATURE: 98 F | DIASTOLIC BLOOD PRESSURE: 69 MMHG | HEART RATE: 68 BPM | OXYGEN SATURATION: 97 % | SYSTOLIC BLOOD PRESSURE: 128 MMHG | RESPIRATION RATE: 18 BRPM

## 2019-09-06 DIAGNOSIS — H61.23 BILATERAL IMPACTED CERUMEN: Primary | ICD-10-CM

## 2019-09-06 PROCEDURE — 99213 OFFICE O/P EST LOW 20 MIN: CPT

## 2019-09-06 PROCEDURE — 69209 REMOVE IMPACTED EAR WAX UNI: CPT

## 2019-09-06 PROCEDURE — 99212 OFFICE O/P EST SF 10 MIN: CPT

## 2019-09-06 NOTE — ED INITIAL ASSESSMENT (HPI)
Pt c/o hearing loss in both ears since yesterday after using a cotton swab in bilateral ears. Denies other s/s.

## 2019-09-07 NOTE — ED PROVIDER NOTES
Patient Seen in: [address]    History   Patient presents with:  Ear Problem    Stated Complaint: impacted ears    HPI    19-year-old male presents to the emergency department states that \"my ears are clogged\" he is havin impactions his neck is supple and his oropharynx is within normal limits    ED Course   Labs Reviewed - No data to display       Irrigated here in the emergency department large plugs of wax were removed.   Patient be discharged home with Debrox to be used

## 2020-10-27 ENCOUNTER — APPOINTMENT (OUTPATIENT)
Dept: GENERAL RADIOLOGY | Age: 45
End: 2020-10-27
Attending: NURSE PRACTITIONER
Payer: MEDICAID

## 2020-10-27 ENCOUNTER — HOSPITAL ENCOUNTER (OUTPATIENT)
Age: 45
Discharge: HOME | End: 2020-10-27
Payer: MEDICAID

## 2020-10-27 VITALS
OXYGEN SATURATION: 96 % | BODY MASS INDEX: 34.36 KG/M2 | HEIGHT: 70 IN | DIASTOLIC BLOOD PRESSURE: 71 MMHG | TEMPERATURE: 98 F | SYSTOLIC BLOOD PRESSURE: 114 MMHG | HEART RATE: 72 BPM | RESPIRATION RATE: 16 BRPM | WEIGHT: 240 LBS

## 2020-10-27 DIAGNOSIS — S83.92XA SPRAIN OF LEFT KNEE, UNSPECIFIED LIGAMENT, INITIAL ENCOUNTER: Primary | ICD-10-CM

## 2020-10-27 DIAGNOSIS — S89.90XA KNEE INJURY: ICD-10-CM

## 2020-10-27 PROCEDURE — 73562 X-RAY EXAM OF KNEE 3: CPT | Performed by: NURSE PRACTITIONER

## 2020-10-27 PROCEDURE — 99214 OFFICE O/P EST MOD 30 MIN: CPT | Performed by: NURSE PRACTITIONER

## 2020-10-27 PROCEDURE — A6449 LT COMPRES BAND >=3" <5"/YD: HCPCS | Performed by: NURSE PRACTITIONER

## 2020-10-27 RX ORDER — HYDROCODONE BITARTRATE AND ACETAMINOPHEN 5; 325 MG/1; MG/1
1-2 TABLET ORAL EVERY 6 HOURS PRN
Qty: 10 TABLET | Refills: 0 | Status: SHIPPED | OUTPATIENT
Start: 2020-10-27 | End: 2020-11-03

## 2020-10-27 RX ORDER — NAPROXEN 500 MG/1
500 TABLET ORAL 2 TIMES DAILY PRN
Qty: 20 TABLET | Refills: 0 | Status: SHIPPED | OUTPATIENT
Start: 2020-10-27 | End: 2020-11-03

## 2020-10-27 NOTE — ED PROVIDER NOTES
Patient Seen in: [address]      History   Patient presents with:  Knee Pain    Stated Complaint: left knee pain    HPI    13-year-old male presents for evaluation of left knee pain.   He slipped on wet water in the garage 6 days ago distress. Appearance: He is not toxic-appearing. HENT:      Head: Normocephalic and atraumatic.       Nose: Nose normal.      Mouth/Throat:      Mouth: Mucous membranes are moist.   Eyes:      Conjunctiva/sclera: Conjunctivae normal.   Neck:      Musc intact. His main concern is pain control, and has not been improved with ibuprofen or Aleve at home. He is given a prescription for naproxen and Norco, 10 tabs for severe pain or help with sleeping due to discomfort. He is to follow-up with orthopedics.

## 2020-11-15 ENCOUNTER — HOSPITAL ENCOUNTER (OUTPATIENT)
Age: 45
Discharge: HOME | End: 2020-11-15
Payer: MEDICAID

## 2020-11-15 ENCOUNTER — APPOINTMENT (OUTPATIENT)
Dept: CT IMAGING | Age: 45
End: 2020-11-15
Attending: PHYSICIAN ASSISTANT
Payer: MEDICAID

## 2020-11-15 VITALS
TEMPERATURE: 97 F | DIASTOLIC BLOOD PRESSURE: 78 MMHG | SYSTOLIC BLOOD PRESSURE: 135 MMHG | OXYGEN SATURATION: 97 % | RESPIRATION RATE: 18 BRPM | HEART RATE: 95 BPM

## 2020-11-15 DIAGNOSIS — N28.89 RENAL MASS, RIGHT: ICD-10-CM

## 2020-11-15 DIAGNOSIS — S39.012A STRAIN OF LUMBAR REGION, INITIAL ENCOUNTER: ICD-10-CM

## 2020-11-15 DIAGNOSIS — R31.9 HEMATURIA, UNSPECIFIED TYPE: Primary | ICD-10-CM

## 2020-11-15 PROCEDURE — 99213 OFFICE O/P EST LOW 20 MIN: CPT | Performed by: PHYSICIAN ASSISTANT

## 2020-11-15 PROCEDURE — 74176 CT ABD & PELVIS W/O CONTRAST: CPT | Performed by: PHYSICIAN ASSISTANT

## 2020-11-15 PROCEDURE — 81002 URINALYSIS NONAUTO W/O SCOPE: CPT | Performed by: PHYSICIAN ASSISTANT

## 2020-11-15 RX ORDER — NAPROXEN 500 MG/1
500 TABLET ORAL 2 TIMES DAILY PRN
Qty: 20 TABLET | Refills: 0 | Status: SHIPPED | OUTPATIENT
Start: 2020-11-15 | End: 2020-11-22

## 2020-11-15 RX ORDER — CYCLOBENZAPRINE HCL 10 MG
10 TABLET ORAL 3 TIMES DAILY PRN
Qty: 10 TABLET | Refills: 0 | Status: SHIPPED | OUTPATIENT
Start: 2020-11-15 | End: 2020-11-22

## 2020-11-15 NOTE — ED PROVIDER NOTES
Patient Seen in: [facility]      History   Patient presents with:  Back Pain    Stated Complaint: back pain    HPI    CHIEF COMPLAINT: Right low back pain     HISTORY OF PRESENT ILLNESS: Patient is a pleasant 43-year-old male with past medical elements is as listed in today's nurse's notes are reviewed and agree. The patient's family history is reviewed and is noncontributory to the presenting problem, except as indicated as above.     Past Medical History:   Diagnosis Date   • Allergic rhinitis +5 out of 5 bilateral lower extremity strength. 2+ patellar DTRs. Normal sensation. No edema bilaterally. Abdomen: Soft, mild tenderness to palpation in the right lower quadrant, nondistended. No pulsatile masses. No CVA tenderness bilaterally.     E three days. FINDINGS:  Please note the exam is somewhat limited without intravenous or oral contrast.  KIDNEYS:  No evidence of renal stones or hydronephrosis.   A cyst is likely present of the right kidney measuring up to 5.2 x 5.1 cm ADRENALS:  No mass kidneys. I reviewed these results with the patient. He did not remember following with urology at all for the renal mass, so I gave him a referral to urology. For now we will give the patient naproxen and Flexeril for suspected right low back strain.   H

## 2021-03-15 NOTE — PROGRESS NOTES
CHIEF COMPLAINT:   Patient presents with:  Cough: x 2 weeks        HPI:   Joya Siemens is a 36year old male who presents for cough for  2  weeks. Cough started gradually and is described as tight and deep. Patient has history of bronchitis.  This is  simila REVIEW OF SYSTEMS:   GENERAL: No fever or chills. SKIN: No rashes, or other skin lesions. EYES: Denies blurred vision or double vision. HENT: Denies ear pain, decreased hearing, or sore throat. Reports mild sinus congestion.   CARDIOVASCULAR: Rachel Garrison azithromycin 250 MG Oral Tab 6 tablet 0      Sig: Take two tablets by mouth today, then one daily. Side effects, risks, benefits, of medication explained and discussed.     Patient Instructions     Bronchitis, Antibiotic Treatment (Adult)    Daija Kern · Over-the-counter cough, cold, and sore-throat medicines will not shorten the length of the illness, but they may be helpful to reduce symptoms. (Note: Do not use decongestants if you have high blood pressure.)  · Finish all antibiotic medicine.  Do this e none

## 2021-06-03 ENCOUNTER — APPOINTMENT (OUTPATIENT)
Dept: GENERAL RADIOLOGY | Age: 46
End: 2021-06-03
Attending: PHYSICIAN ASSISTANT
Payer: MEDICAID

## 2021-06-03 ENCOUNTER — HOSPITAL ENCOUNTER (OUTPATIENT)
Age: 46
Discharge: HOME | End: 2021-06-03
Payer: MEDICAID

## 2021-06-03 VITALS
DIASTOLIC BLOOD PRESSURE: 72 MMHG | RESPIRATION RATE: 18 BRPM | TEMPERATURE: 97 F | BODY MASS INDEX: 34 KG/M2 | SYSTOLIC BLOOD PRESSURE: 116 MMHG | OXYGEN SATURATION: 97 % | HEART RATE: 73 BPM | WEIGHT: 242.06 LBS

## 2021-06-03 DIAGNOSIS — S20.211A CONTUSION OF RIB ON RIGHT SIDE, INITIAL ENCOUNTER: Primary | ICD-10-CM

## 2021-06-03 PROCEDURE — 99213 OFFICE O/P EST LOW 20 MIN: CPT | Performed by: PHYSICIAN ASSISTANT

## 2021-06-03 PROCEDURE — 71101 X-RAY EXAM UNILAT RIBS/CHEST: CPT | Performed by: PHYSICIAN ASSISTANT

## 2021-06-03 RX ORDER — TRAMADOL HYDROCHLORIDE 50 MG/1
50 TABLET ORAL NIGHTLY PRN
Qty: 7 TABLET | Refills: 0 | Status: SHIPPED | OUTPATIENT
Start: 2021-06-03 | End: 2021-06-10

## 2021-06-03 NOTE — ED INITIAL ASSESSMENT (HPI)
Slipped on carpeted steps approx. 4 days ago. Pain is deep right side mid, thorasic, increased w ROM - minimal relief w antiinflammatory. Denies head injury. Denies hematuria. Denies dyspnea.

## 2021-06-03 NOTE — ED PROVIDER NOTES
Patient Seen in: Immediate [address] [place] [address] Highway      History   Patient presents with:  Fall: left mid back injury    Stated Complaint: RIB PAIN    HPI/Subjective:   HPI    25-year-old male presents to the immediate care with right rib pain since 4 days Conjunctiva/sclera: Conjunctivae normal.   Cardiovascular:      Rate and Rhythm: Normal rate and regular rhythm. Pulmonary:      Effort: Pulmonary effort is normal.      Breath sounds: Normal breath sounds.    Chest:      Chest wall: Tenderness (right low pain control. Symptoms are self-limited. We will also give short course of tramadol for nighttime use to help with sleeping. All questions answered.                              Disposition and Plan     Clinical Impression:  Contusion of rib on right po

## 2021-06-27 PROBLEM — M54.50 CHRONIC BILATERAL LOW BACK PAIN WITHOUT SCIATICA: Status: ACTIVE | Noted: 2021-06-27

## 2021-06-27 PROBLEM — G89.29 CHRONIC BILATERAL LOW BACK PAIN WITHOUT SCIATICA: Status: ACTIVE | Noted: 2021-06-27

## 2021-11-12 ENCOUNTER — APPOINTMENT (OUTPATIENT)
Dept: GENERAL RADIOLOGY | Age: 46
End: 2021-11-12
Attending: PHYSICIAN ASSISTANT
Payer: MEDICAID

## 2021-11-12 ENCOUNTER — HOSPITAL ENCOUNTER (OUTPATIENT)
Age: 46
Discharge: HOME | End: 2021-11-12
Payer: MEDICAID

## 2021-11-12 VITALS
TEMPERATURE: 99 F | BODY MASS INDEX: 32.2 KG/M2 | SYSTOLIC BLOOD PRESSURE: 155 MMHG | WEIGHT: 230 LBS | RESPIRATION RATE: 18 BRPM | DIASTOLIC BLOOD PRESSURE: 84 MMHG | OXYGEN SATURATION: 96 % | HEIGHT: 71 IN | HEART RATE: 88 BPM

## 2021-11-12 DIAGNOSIS — B34.9 VIRAL ILLNESS: Primary | ICD-10-CM

## 2021-11-12 DIAGNOSIS — Z20.822 COVID-19 RULED OUT: ICD-10-CM

## 2021-11-12 PROCEDURE — 99213 OFFICE O/P EST LOW 20 MIN: CPT | Performed by: PHYSICIAN ASSISTANT

## 2021-11-12 PROCEDURE — 71046 X-RAY EXAM CHEST 2 VIEWS: CPT | Performed by: PHYSICIAN ASSISTANT

## 2021-11-12 PROCEDURE — U0002 COVID-19 LAB TEST NON-CDC: HCPCS | Performed by: PHYSICIAN ASSISTANT

## 2021-11-12 PROCEDURE — 87880 STREP A ASSAY W/OPTIC: CPT | Performed by: PHYSICIAN ASSISTANT

## 2021-11-12 NOTE — ED PROVIDER NOTES
Patient Seen in: Immediate [address] [place] [address]      History   Patient presents with:  Covid-19 Test    Stated Complaint: fever, headaches  x 3 days     Subjective:   HPI    58-year-old male presents to the [address] [place] [address] for evaluation of fever for 3 days.   Carly ear normal.      Nose: Congestion present. Mouth/Throat:      Mouth: Mucous membranes are moist.      Pharynx: Posterior oropharyngeal erythema present.    Eyes:      Conjunctiva/sclera: Conjunctivae normal.   Cardiovascular:      Rate and Rhythm: Norm

## 2021-11-12 NOTE — ED INITIAL ASSESSMENT (HPI)
Felt like had a fever but never checked temp. Intermittent sinus pressure both relieved w motrin.   Requesting Covid test.

## 2021-11-28 ENCOUNTER — HOSPITAL ENCOUNTER (OUTPATIENT)
Age: 46
Discharge: HOME | End: 2021-11-28
Payer: MEDICAID

## 2021-11-28 VITALS
SYSTOLIC BLOOD PRESSURE: 161 MMHG | BODY MASS INDEX: 32.2 KG/M2 | RESPIRATION RATE: 20 BRPM | HEART RATE: 76 BPM | WEIGHT: 230 LBS | OXYGEN SATURATION: 96 % | DIASTOLIC BLOOD PRESSURE: 88 MMHG | HEIGHT: 71 IN | TEMPERATURE: 98 F

## 2021-11-28 DIAGNOSIS — H00.024 HORDEOLUM INTERNUM OF LEFT UPPER EYELID: Primary | ICD-10-CM

## 2021-11-28 PROCEDURE — 99213 OFFICE O/P EST LOW 20 MIN: CPT | Performed by: PHYSICIAN ASSISTANT

## 2021-11-28 RX ORDER — OFLOXACIN 3 MG/ML
2 SOLUTION/ DROPS OPHTHALMIC 4 TIMES DAILY
Qty: 5 ML | Refills: 0 | Status: SHIPPED | OUTPATIENT
Start: 2021-11-28 | End: 2022-02-02

## 2021-11-28 NOTE — ED PROVIDER NOTES
Patient Seen in: [facility]      History   Patient presents with:  Eyelid Swelling    Stated Complaint: swollen eyelid    Subjective:   HPI    Pleasant 58-year-old gentleman. Medical history of diabetes, hypertension, sleep apnea.   In the las Conjunctive is normal.  No concurrent cold symptoms  Extremities: Full ROM, no deformity, NVI  Neuro:  Normal Gait    ED Course   Labs Reviewed - No data to display                MDM        Eyelid eversion demonstrates a hordeolum internum to the left upp

## 2021-12-06 PROBLEM — E78.1 HYPERTRIGLYCERIDEMIA: Status: ACTIVE | Noted: 2021-12-06

## 2021-12-06 PROBLEM — R74.8 ELEVATED LIVER ENZYMES: Status: ACTIVE | Noted: 2021-12-06

## 2021-12-20 ENCOUNTER — HOSPITAL ENCOUNTER (OUTPATIENT)
Age: 46
Discharge: LEFT BEFORE DISCHARGE | End: 2021-12-20
Payer: MEDICAID

## 2021-12-22 ENCOUNTER — HOSPITAL ENCOUNTER (OUTPATIENT)
Age: 46
Discharge: HOME | End: 2021-12-22
Payer: MEDICAID

## 2021-12-22 VITALS
HEIGHT: 70 IN | DIASTOLIC BLOOD PRESSURE: 87 MMHG | WEIGHT: 230 LBS | BODY MASS INDEX: 32.93 KG/M2 | TEMPERATURE: 98 F | HEART RATE: 79 BPM | RESPIRATION RATE: 16 BRPM | SYSTOLIC BLOOD PRESSURE: 123 MMHG | OXYGEN SATURATION: 97 %

## 2021-12-22 DIAGNOSIS — H61.23 BILATERAL IMPACTED CERUMEN: Primary | ICD-10-CM

## 2021-12-22 PROCEDURE — 99213 OFFICE O/P EST LOW 20 MIN: CPT | Performed by: NURSE PRACTITIONER

## 2021-12-22 RX ORDER — NEOMYCIN SULFATE, POLYMYXIN B SULFATE AND HYDROCORTISONE 10; 3.5; 1 MG/ML; MG/ML; [USP'U]/ML
SUSPENSION/ DROPS AURICULAR (OTIC)
Qty: 10 ML | Refills: 0 | Status: SHIPPED | OUTPATIENT
Start: 2021-12-22

## 2021-12-23 NOTE — ED PROVIDER NOTES
Patient Seen in: Immediate [address] [place], [place] [address]way      History   Patient presents with:  Ear Problem Pain    Stated Complaint: ear pain and clogged ears  x 2 days     Subjective:   HPI  Patient is 44-year-old male past medical history of type 2 diabetes m Constitutional:       General: He is not in acute distress. Appearance: Normal appearance. He is well-developed. He is not ill-appearing, toxic-appearing or diaphoretic. HENT:      Right Ear: There is impacted cerumen. Left Ear:  There is impac pm    Follow-up:  Jeff Espinoza MD  [address] [phone]      As needed          Medications Prescribed:  Current Discharge Medication List    START taking these medications    neomycin-polymyxin-hydrocortisone [id number] Ot

## 2022-02-02 ENCOUNTER — HOSPITAL ENCOUNTER (EMERGENCY)
Age: 47
Discharge: HOME | End: 2022-02-02
Attending: EMERGENCY MEDICINE
Payer: MEDICAID

## 2022-02-02 VITALS
BODY MASS INDEX: 32.93 KG/M2 | HEIGHT: 70 IN | WEIGHT: 230 LBS | TEMPERATURE: 98 F | HEART RATE: 65 BPM | DIASTOLIC BLOOD PRESSURE: 77 MMHG | SYSTOLIC BLOOD PRESSURE: 136 MMHG | RESPIRATION RATE: 16 BRPM | OXYGEN SATURATION: 100 %

## 2022-02-02 DIAGNOSIS — H60.502 ACUTE OTITIS EXTERNA OF LEFT EAR, UNSPECIFIED TYPE: Primary | ICD-10-CM

## 2022-02-02 PROCEDURE — 99283 EMERGENCY DEPT VISIT LOW MDM: CPT

## 2022-02-02 RX ORDER — CIPROFLOXACIN AND DEXAMETHASONE 3; 1 MG/ML; MG/ML
4 SUSPENSION/ DROPS AURICULAR (OTIC) 2 TIMES DAILY
Qty: 1 EACH | Refills: 0 | Status: SHIPPED | OUTPATIENT
Start: 2022-02-02 | End: 2022-02-09

## 2022-02-02 RX ORDER — CIPROFLOXACIN AND DEXAMETHASONE 3; 1 MG/ML; MG/ML
4 SUSPENSION/ DROPS AURICULAR (OTIC) ONCE
Status: COMPLETED | OUTPATIENT
Start: 2022-02-02 | End: 2022-02-02

## 2022-02-03 ENCOUNTER — APPOINTMENT (OUTPATIENT)
Dept: CT IMAGING | Age: 47
End: 2022-02-03
Attending: NURSE PRACTITIONER
Payer: MEDICAID

## 2022-02-03 ENCOUNTER — HOSPITAL ENCOUNTER (OUTPATIENT)
Age: 47
Discharge: HOME | End: 2022-02-03
Payer: MEDICAID

## 2022-02-03 VITALS
HEIGHT: 70 IN | HEART RATE: 68 BPM | DIASTOLIC BLOOD PRESSURE: 61 MMHG | RESPIRATION RATE: 16 BRPM | WEIGHT: 230 LBS | BODY MASS INDEX: 32.93 KG/M2 | SYSTOLIC BLOOD PRESSURE: 128 MMHG | OXYGEN SATURATION: 100 % | TEMPERATURE: 98 F

## 2022-02-03 DIAGNOSIS — H60.92 OTITIS EXTERNA OF LEFT EAR, UNSPECIFIED CHRONICITY, UNSPECIFIED TYPE: Primary | ICD-10-CM

## 2022-02-03 DIAGNOSIS — H66.92 CHRONIC LEFT MIDDLE EAR INFECTION: ICD-10-CM

## 2022-02-03 LAB
#MXD IC: 0.7 X10ˆ3/UL (ref 0.1–1)
BASOPHILS # BLD AUTO: 0.07 X10(3) UL (ref 0–0.2)
BASOPHILS NFR BLD AUTO: 0.9 %
BUN BLD-MCNC: 9 MG/DL (ref 7–18)
CHLORIDE BLD-SCNC: 105 MMOL/L (ref 98–112)
CO2 BLD-SCNC: 24 MMOL/L (ref 21–32)
CREAT BLD-MCNC: 0.7 MG/DL
EOSINOPHIL # BLD AUTO: 0.23 X10(3) UL (ref 0–0.7)
EOSINOPHIL NFR BLD AUTO: 2.9 %
ERYTHROCYTE [DISTWIDTH] IN BLOOD BY AUTOMATED COUNT: 12.8 %
GLUCOSE BLD-MCNC: 128 MG/DL (ref 70–99)
HCT VFR BLD AUTO: 45.7 %
HCT VFR BLD AUTO: 45.8 %
HCT VFR BLD CALC: 46 %
HGB BLD-MCNC: 15.3 G/DL
HGB BLD-MCNC: 15.4 G/DL
IMM GRANULOCYTES # BLD AUTO: 0.02 X10(3) UL (ref 0–1)
IMM GRANULOCYTES NFR BLD: 0.3 %
ISTAT IONIZED CALCIUM FOR CHEM 8: 1.21 MMOL/L (ref 1.12–1.32)
LYMPHOCYTES # BLD AUTO: 2.1 X10ˆ3/UL (ref 1–4)
LYMPHOCYTES # BLD AUTO: 2.13 X10(3) UL (ref 1–4)
LYMPHOCYTES NFR BLD AUTO: 27.2 %
MCH RBC QN AUTO: 27.7 PG (ref 26–34)
MCH RBC QN AUTO: 28.1 PG (ref 26–34)
MCHC RBC AUTO-ENTMCNC: 33.4 G/DL (ref 31–37)
MCHC RBC AUTO-ENTMCNC: 33.7 G/DL (ref 31–37)
MCV RBC AUTO: 82.2 FL
MCV RBC AUTO: 84.2 FL (ref 80–100)
MIXED CELL %: 9.3 %
MONOCYTES # BLD AUTO: 0.59 X10(3) UL (ref 0.1–1)
MONOCYTES NFR BLD AUTO: 7.4 %
NEUTROPHILS # BLD AUTO: 4.9 X10ˆ3/UL (ref 1.5–7.7)
NEUTROPHILS # BLD AUTO: 4.96 X10 (3) UL (ref 1.5–7.7)
NEUTROPHILS # BLD AUTO: 4.96 X10(3) UL (ref 1.5–7.7)
NEUTROPHILS NFR BLD AUTO: 61.9 %
NEUTROPHILS NFR BLD AUTO: 63.5 %
PLATELET # BLD AUTO: 230 10(3)UL (ref 150–450)
POTASSIUM BLD-SCNC: 3.6 MMOL/L (ref 3.6–5.1)
RBC # BLD AUTO: 5.44 X10ˆ6/UL
RBC # BLD AUTO: 5.56 X10(6)UL
SODIUM BLD-SCNC: 142 MMOL/L (ref 136–145)
WBC # BLD AUTO: 7.7 X10ˆ3/UL (ref 4–11)
WBC # BLD AUTO: 8 X10(3) UL (ref 4–11)

## 2022-02-03 PROCEDURE — 80047 BASIC METABLC PNL IONIZED CA: CPT | Performed by: NURSE PRACTITIONER

## 2022-02-03 PROCEDURE — A9150 MISC/EXPER NON-PRESCRIPT DRU: HCPCS | Performed by: NURSE PRACTITIONER

## 2022-02-03 PROCEDURE — 99214 OFFICE O/P EST MOD 30 MIN: CPT | Performed by: NURSE PRACTITIONER

## 2022-02-03 PROCEDURE — 85025 COMPLETE CBC W/AUTO DIFF WBC: CPT | Performed by: NURSE PRACTITIONER

## 2022-02-03 PROCEDURE — 70481 CT ORBIT/EAR/FOSSA W/DYE: CPT | Performed by: NURSE PRACTITIONER

## 2022-02-03 RX ORDER — HYDROCODONE BITARTRATE AND ACETAMINOPHEN 5; 325 MG/1; MG/1
1 TABLET ORAL EVERY 6 HOURS PRN
Qty: 15 TABLET | Refills: 0 | Status: SHIPPED | OUTPATIENT
Start: 2022-02-03 | End: 2022-02-08

## 2022-02-03 RX ORDER — CLINDAMYCIN HYDROCHLORIDE 300 MG/1
300 CAPSULE ORAL 3 TIMES DAILY
Qty: 30 CAPSULE | Refills: 0 | Status: SHIPPED | OUTPATIENT
Start: 2022-02-03 | End: 2022-02-13

## 2022-02-03 RX ORDER — ACETAMINOPHEN 500 MG
1000 TABLET ORAL ONCE
Status: COMPLETED | OUTPATIENT
Start: 2022-02-03 | End: 2022-02-03

## 2022-02-03 NOTE — ED INITIAL ASSESSMENT (HPI)
Co left ear pain x 4 days. dx'd with otitis externa yesterday. Prescribed drops. Sts that the ain in his left ear is intensifying.

## 2022-12-08 ENCOUNTER — OFFICE VISIT (OUTPATIENT)
Dept: SLEEP CENTER | Age: 47
End: 2022-12-08
Attending: INTERNAL MEDICINE
Payer: MEDICAID

## 2022-12-08 DIAGNOSIS — G47.33 OSA (OBSTRUCTIVE SLEEP APNEA): ICD-10-CM

## 2022-12-08 PROCEDURE — 95810 POLYSOM 6/> YRS 4/> PARAM: CPT

## 2023-04-09 ENCOUNTER — HOSPITAL ENCOUNTER (EMERGENCY)
Age: 48
Discharge: HOME | End: 2023-04-09
Payer: MEDICAID

## 2023-04-09 VITALS
DIASTOLIC BLOOD PRESSURE: 84 MMHG | RESPIRATION RATE: 18 BRPM | HEART RATE: 88 BPM | SYSTOLIC BLOOD PRESSURE: 140 MMHG | TEMPERATURE: 98 F | WEIGHT: 230 LBS | HEIGHT: 70 IN | OXYGEN SATURATION: 97 % | BODY MASS INDEX: 32.93 KG/M2

## 2023-04-09 DIAGNOSIS — H65.92 LEFT NON-SUPPURATIVE OTITIS MEDIA: ICD-10-CM

## 2023-04-09 DIAGNOSIS — H60.90 OTITIS EXTERNA, UNSPECIFIED CHRONICITY, UNSPECIFIED LATERALITY, UNSPECIFIED TYPE: Primary | ICD-10-CM

## 2023-04-09 PROCEDURE — 99283 EMERGENCY DEPT VISIT LOW MDM: CPT

## 2023-04-09 RX ORDER — AMOXICILLIN 875 MG/1
875 TABLET, COATED ORAL 2 TIMES DAILY
Qty: 14 TABLET | Refills: 0 | Status: SHIPPED | OUTPATIENT
Start: 2023-04-09 | End: 2023-04-16

## 2023-04-10 ENCOUNTER — ORDER TRANSCRIPTION (OUTPATIENT)
Dept: SLEEP CENTER | Age: 48
End: 2023-04-10

## 2023-04-10 DIAGNOSIS — E66.9 OBESITY: ICD-10-CM

## 2023-04-10 DIAGNOSIS — R06.83 SNORING: Primary | ICD-10-CM

## 2023-04-10 DIAGNOSIS — R06.81 APNEA: ICD-10-CM

## 2023-06-13 NOTE — DISCHARGE INSTRUCTIONS
Apply the Cortisporin otic drops 4 drops 4 times a day  Remove ear wick after 36 hours  Tylenol or Motrin for pain  Norco for severe pain

## 2023-06-13 NOTE — ED INITIAL ASSESSMENT (HPI)
52 y/o M arrives to ED with c/o increasing right ear pain for 3 days. Patient reports he has been told in the past that he has too much ear wax in his right ear. Patient reports he was told to \"blast water in his ear while in the shower\" to help break up the wax but pain is only getting worse.

## 2023-06-13 NOTE — ED PROVIDER NOTES
Patient reports 3 days of right-sided ear pain. He thought his ears were clogged with wax. He stood in the shower and had the shower water running forcefully into his ear. Since then, has had increased discomfort in the right ear. On examination, this alert adult man who appears in no extraordinary distress. Eyes sclera white conjunctive pink  Ears: The left canal has some cerumen obscuring about 50% of the tympanic membrane. Tympanic membrane appears normal.  The right canal is nearly swelled shut and I cannot identify the tympanic membrane. No extraordinary cerumen is noted. There is tenderness with palpation of the tragus and movement of the ear pinna. There is some crusted secretions at the external auditory meatus. Patient's examination consistent with otitis externa. Canal is nearly swelled shut and an ear wick would be appropriate. Ear wick was placed by physician assistant and patient treated with Cortisporin otic suspension. I advised him to continue the antibiotic eardrop while the wick is in and after the wick comes out for total of at least 7 days. Ear wick may be removed after 36 hours. Patient may have Tylenol or Motrin alternating for pain substituting Norco for the Tylenol for severe pain    Patient in agreement the plan    I provided a substantive portion of care for this patient. I personally performed the medical decision making for this encounter.

## 2023-06-15 NOTE — ED NOTES
Pt called the department states he is no better after 2 days. Did speak with MD singleton here today, states to have this patient follow up with ENT.  RN did call the patient to give the referral

## 2023-06-15 NOTE — CM/SW NOTE
Patient asking for pain medication. He was seen 2 days ago in the ER and was not given anything for pain. MD Escribed [address]an [address]. RX was not filled because patient's name did not match. Confirmed birthday, name and address. Pharmacy filling [employer]. Called and updated patient.

## (undated) NOTE — Clinical Note
04/03/2017        Viridiana Arias  [address]  [place] [address]      Dear Michelle Jc,    [facility] records indicate that you have outstanding lab work and or testing that was ordered for you and has not yet been completed:      COMP METABOLIC PANEL  CBC

## (undated) NOTE — ED AVS SNAPSHOT
Chelle Qiu   MRN: [id number]    Department:  [facility] Emergency Department   Date of Visit:  10/1/2018           Disclosure     Insurance plans vary and the physician(s) referred by the ER may not be covered by your plan.  Please contact your ins tell this physician (or your personal doctor if your instructions are to return to your personal doctor) about any new or lasting problems. The primary care or specialist physician will see patients referred from the [facility] Emergency Department.  Salvadore Skiff

## (undated) NOTE — MR AVS SNAPSHOT
[address] [phone]               Thank you for choosing us for your health care visit with Yuliana Thacker MD.  We are glad to serve you and happy to provide you with this summary of med list.                Atorvastatin Calcium 10 MG Tabs   Take 1 tablet (10 mg total) by mouth daily. Commonly known as:  LIPITOR           BuPROPion HCl ER (SR) 200 MG Tb12   Take 1 tablet (200 mg total) by mouth 2 (two) times daily.    What changed:  A You can get these medications from any pharmacy     Bring a paper prescription for each of these medications    - Phentermine HCl 37.5 MG Tabs            MyChart     Visit MyChart  You can access your MyChart to more actively manage your health care and vi 2 ½ hours per week – spread out over time Use a mario to keep you motivated   Don’t forget strength training with weights and resistance Set goals and track your progress   You don’t need to join a gym. Home exercises work great.  Put more priority on exe

## (undated) NOTE — Clinical Note
I had the pleasure of seeing Mr. Ankit Bowles in my office today. Please see my attached note.     Nicole John

## (undated) NOTE — ED AVS SNAPSHOT
[facility]   MRN: [id number]    Department:  [facility] Emergency Department   Date of Visit:  4/25/2018           Disclosure     Insurance plans vary and the physician(s) referred by the ER may not be covered by your plan.  Please contact your ins tell this physician (or your personal doctor if your instructions are to return to your personal doctor) about any new or lasting problems. The primary care or specialist physician will see patients referred from the [facility] Emergency Department.  Nikita Dela Cruz

## (undated) NOTE — LETTER
July 2, 2019      [address]  [facility] [id number]        Dear Ayana Tran: We recently released test results for you via [employer] and we noticed you have not yet viewed them.     Please log into [employer] to view the results